# Patient Record
Sex: FEMALE | Race: WHITE
[De-identification: names, ages, dates, MRNs, and addresses within clinical notes are randomized per-mention and may not be internally consistent; named-entity substitution may affect disease eponyms.]

---

## 2020-08-26 ENCOUNTER — HOSPITAL ENCOUNTER (OUTPATIENT)
Dept: HOSPITAL 83 - ED | Age: 85
Setting detail: OBSERVATION
LOS: 2 days | Discharge: HOME | End: 2020-08-28
Attending: INTERNAL MEDICINE | Admitting: INTERNAL MEDICINE
Payer: MEDICARE

## 2020-08-26 VITALS — WEIGHT: 210.13 LBS | HEIGHT: 63.98 IN | BODY MASS INDEX: 35.87 KG/M2

## 2020-08-26 VITALS — DIASTOLIC BLOOD PRESSURE: 80 MMHG

## 2020-08-26 VITALS — SYSTOLIC BLOOD PRESSURE: 152 MMHG | DIASTOLIC BLOOD PRESSURE: 60 MMHG

## 2020-08-26 VITALS — DIASTOLIC BLOOD PRESSURE: 74 MMHG | SYSTOLIC BLOOD PRESSURE: 174 MMHG

## 2020-08-26 VITALS — SYSTOLIC BLOOD PRESSURE: 175 MMHG | DIASTOLIC BLOOD PRESSURE: 69 MMHG

## 2020-08-26 VITALS — DIASTOLIC BLOOD PRESSURE: 78 MMHG | SYSTOLIC BLOOD PRESSURE: 182 MMHG

## 2020-08-26 VITALS — SYSTOLIC BLOOD PRESSURE: 182 MMHG | DIASTOLIC BLOOD PRESSURE: 90 MMHG

## 2020-08-26 VITALS — DIASTOLIC BLOOD PRESSURE: 68 MMHG | SYSTOLIC BLOOD PRESSURE: 176 MMHG

## 2020-08-26 DIAGNOSIS — J01.10: ICD-10-CM

## 2020-08-26 DIAGNOSIS — R51: ICD-10-CM

## 2020-08-26 DIAGNOSIS — I16.1: Primary | ICD-10-CM

## 2020-08-26 DIAGNOSIS — E78.5: ICD-10-CM

## 2020-08-26 DIAGNOSIS — E55.9: ICD-10-CM

## 2020-08-26 DIAGNOSIS — E66.9: ICD-10-CM

## 2020-08-26 DIAGNOSIS — I10: ICD-10-CM

## 2020-08-26 DIAGNOSIS — R79.82: ICD-10-CM

## 2020-08-26 LAB
ALBUMIN SERPL-MCNC: 3.7 GM/DL (ref 3.1–4.5)
ALP SERPL-CCNC: 91 U/L (ref 45–117)
ALT SERPL W P-5'-P-CCNC: 19 U/L (ref 12–78)
APPEARANCE UR: CLEAR
APTT PPP: 29.9 SECONDS (ref 20–32.1)
AST SERPL-CCNC: 16 IU/L (ref 3–35)
BACTERIA #/AREA URNS HPF: (no result) /[HPF]
BASOPHILS # BLD AUTO: 0 10*3/UL (ref 0–0.1)
BASOPHILS NFR BLD AUTO: 0.5 % (ref 0–1)
BILIRUB UR QL STRIP: NEGATIVE
BUN SERPL-MCNC: 16 MG/DL (ref 7–24)
CHLORIDE SERPL-SCNC: 107 MMOL/L (ref 98–107)
COLOR UR: YELLOW
CREAT SERPL-MCNC: 0.79 MG/DL (ref 0.55–1.02)
EOSINOPHIL # BLD AUTO: 0 10*3/UL (ref 0–0.4)
EOSINOPHIL # BLD AUTO: 0.5 % (ref 1–4)
EPI CELLS #/AREA URNS HPF: (no result) /[HPF]
ERYTHROCYTE [DISTWIDTH] IN BLOOD BY AUTOMATED COUNT: 13.5 % (ref 0–14.5)
GLUCOSE UR QL: NEGATIVE
HCT VFR BLD AUTO: 44.3 % (ref 37–47)
HGB UR QL STRIP: NEGATIVE
INR BLD: 1 (ref 2–3.5)
KETONES UR QL STRIP: (no result)
LEUKOCYTE ESTERASE UR QL STRIP: (no result)
LIPASE SERPL-CCNC: 229 U/L (ref 73–393)
LYMPHOCYTES # BLD AUTO: 1.2 10*3/UL (ref 1.3–4.4)
LYMPHOCYTES NFR BLD AUTO: 13.9 % (ref 27–41)
MCH RBC QN AUTO: 30.7 PG (ref 27–31)
MCHC RBC AUTO-ENTMCNC: 32.7 G/DL (ref 33–37)
MCV RBC AUTO: 93.7 FL (ref 81–99)
MONOCYTES # BLD AUTO: 0.5 10*3/UL (ref 0.1–1)
MONOCYTES NFR BLD MANUAL: 5.9 % (ref 3–9)
NEUT #: 6.9 10*3/UL (ref 2.3–7.9)
NEUT %: 79 % (ref 47–73)
NITRITE UR QL STRIP: NEGATIVE
NRBC BLD QL AUTO: 0 % (ref 0–0)
PH UR STRIP: 7 [PH] (ref 4.5–8)
PLATELET # BLD AUTO: 253 10*3/UL (ref 130–400)
PMV BLD AUTO: 10.6 FL (ref 9.6–12.3)
POTASSIUM SERPL-SCNC: 4.3 MMOL/L (ref 3.5–5.1)
PROT SERPL-MCNC: 8.2 GM/DL (ref 6.4–8.2)
RBC # BLD AUTO: 4.73 10*6/UL (ref 4.1–5.1)
RBC #/AREA URNS HPF: (no result) RBC/HPF (ref 0–2)
SODIUM SERPL-SCNC: 139 MMOL/L (ref 136–145)
SP GR UR: 1.01 (ref 1–1.03)
TROPONIN I SERPL-MCNC: < 0.015 NG/ML (ref ?–0.04)
UROBILINOGEN UR STRIP-MCNC: 0.2 E.U./DL (ref 0–1)
WBC #/AREA URNS HPF: (no result) WBC/HPF (ref 0–5)
WBC NRBC COR # BLD AUTO: 8.7 10*3/UL (ref 4.8–10.8)

## 2020-08-26 NOTE — NUR
NOTIFIED DR. DE LEON PATIENTS MED REC IS UP TO DATE AND BP.
ALSO PATIENT REQUESTING A DOSE OF ZYRTEC NOW. NOTIFIFED

## 2020-08-26 NOTE — NUR
A 84, admitted to 5E, under the
services of DERRELL Harvey DO with a diagnosis of HYPERTENSIVE EMERGENCY.
Chief complaint is HIGH BP.
Patient arrived via stretcher from ER.
Monitor applied. Initial assessment completed.
Vital signs taken and recorded.
DERRELL HARVEY DO notified of admission to the unit.
Orders received.
See assessment for past medical history, medications
and allergies.
Patient and/or family oriented to unit. [facility] 5TH FLOOR
visitation policy reviewed.
Clothing/patient valuable form completed.
 
SRIDEVI MARTINEZ

## 2020-08-26 NOTE — NUR
PATIENT URINATED BUT LEFT SPECIMEN IN RESTROOM. SPECIMEN HAS BEEN PUT IN
BIOHAZARD DUE TO THIS AND ANOTHER SPECIMEN BEING IN RESTROOM.

## 2020-08-26 NOTE — NUR
PATIENT REPORT RECEIVED FROM SRIDEVI BARNES RN AT THIS TIME. ASSUMED CARE OF
PATIENT AT THIS TIME. PATIENT DENIES ANY PAIN OR DISCOMFORT AT THIS TIME. A&O
X3, CALL LIGHT WITHIN REACH. WILL CONTINUE TO MONITOR.

## 2020-08-27 VITALS — SYSTOLIC BLOOD PRESSURE: 127 MMHG | DIASTOLIC BLOOD PRESSURE: 50 MMHG

## 2020-08-27 VITALS — SYSTOLIC BLOOD PRESSURE: 170 MMHG | DIASTOLIC BLOOD PRESSURE: 82 MMHG

## 2020-08-27 VITALS — SYSTOLIC BLOOD PRESSURE: 144 MMHG | DIASTOLIC BLOOD PRESSURE: 67 MMHG

## 2020-08-27 VITALS — DIASTOLIC BLOOD PRESSURE: 58 MMHG

## 2020-08-27 VITALS — DIASTOLIC BLOOD PRESSURE: 62 MMHG

## 2020-08-27 LAB
25(OH)D3 SERPL-MCNC: 26.6 NG/ML (ref 30–100)
ALBUMIN SERPL-MCNC: 3.6 GM/DL (ref 3.1–4.5)
ALP SERPL-CCNC: 94 U/L (ref 45–117)
ALT SERPL W P-5'-P-CCNC: 19 U/L (ref 12–78)
AST SERPL-CCNC: 17 IU/L (ref 3–35)
BASOPHILS # BLD AUTO: 0 10*3/UL (ref 0–0.1)
BASOPHILS NFR BLD AUTO: 0.5 % (ref 0–1)
BUN SERPL-MCNC: 13 MG/DL (ref 7–24)
CHLORIDE SERPL-SCNC: 106 MMOL/L (ref 98–107)
CHOLEST SERPL-MCNC: 207 MG/DL (ref ?–200)
CREAT SERPL-MCNC: 0.7 MG/DL (ref 0.55–1.02)
EOSINOPHIL # BLD AUTO: 0.1 10*3/UL (ref 0–0.4)
EOSINOPHIL # BLD AUTO: 1.8 % (ref 1–4)
ERYTHROCYTE [DISTWIDTH] IN BLOOD BY AUTOMATED COUNT: 13.4 % (ref 0–14.5)
HCT VFR BLD AUTO: 46.5 % (ref 37–47)
HDLC SERPL-MCNC: 44 MG/DL (ref 40–60)
LDLC SERPL DIRECT ASSAY-MCNC: 145 MG/DL (ref 9–159)
LYMPHOCYTES # BLD AUTO: 1.5 10*3/UL (ref 1.3–4.4)
LYMPHOCYTES NFR BLD AUTO: 21 % (ref 27–41)
MCH RBC QN AUTO: 30.2 PG (ref 27–31)
MCHC RBC AUTO-ENTMCNC: 32.5 G/DL (ref 33–37)
MCV RBC AUTO: 93 FL (ref 81–99)
MONOCYTES # BLD AUTO: 0.6 10*3/UL (ref 0.1–1)
MONOCYTES NFR BLD MANUAL: 8.8 % (ref 3–9)
NEUT #: 4.9 10*3/UL (ref 2.3–7.9)
NEUT %: 67.8 % (ref 47–73)
NRBC BLD QL AUTO: 0 10*3/UL (ref 0–0)
PLATELET # BLD AUTO: 257 10*3/UL (ref 130–400)
PMV BLD AUTO: 10.4 FL (ref 9.6–12.3)
POTASSIUM SERPL-SCNC: 4 MMOL/L (ref 3.5–5.1)
PROT SERPL-MCNC: 8 GM/DL (ref 6.4–8.2)
RBC # BLD AUTO: 5 10*6/UL (ref 4.1–5.1)
SODIUM SERPL-SCNC: 138 MMOL/L (ref 136–145)
TRIGL SERPL-MCNC: 91 MG/DL (ref ?–150)
TROPONIN I: < 0.015 NG/ML
TSH SERPL DL<=0.005 MIU/L-ACNC: 1.84 UIU/ML (ref 0.36–4.75)
VITAMIN B12: 535 PG/ML (ref 247–911)
VLDLC SERPL CALC-MCNC: 18 MG/DL (ref 6–40)
WBC NRBC COR # BLD AUTO: 7.3 10*3/UL (ref 4.8–10.8)

## 2020-08-27 NOTE — NUR
PATIENT RESTING IN BED IN A POSITION OF COMFORT AT THIS TIME, NO SIGNS OR
SYMPTOMS OF PAIN OR DISTRESS NOTED AT THIS TIME. RESPIRATIONS EASY AND
NON-LABORED. CALL LIGHT WITHIN REACH, WILL CONTINUE TO MONITOR.

## 2020-08-27 NOTE — NUR
in to talk to patient.
Patient states lives at home with alone.
There are 27  steps in the home.
Physician: eliu suazo in [place]
Pharmacy: [facility]
Home health services: none
Patient's level of ADLs: INDEPENDENT
Patient has working utilities: all working
DME: cane
Follow-up physician's appointment after d/c: [facility] upon [facility]
Does patient want to access PORTAL?: no
Discharge plan discussed with patient, she states she lives at home alone, is
independent in adls and ambulation, drives, has a cane to use if needed.
discussed with her a discharge plan and she stated she would be returning home
and denies any home needs, discussed [facility] and educated her on the services they
offer, she declined any home needs or home health at this time, case management
will follow.
 
KARYNA GARCIA

## 2020-08-27 NOTE — NUR
PT SITTING UP IN BED, EATING DINNER. NO S/S OF DISTRESS NOTED. RESPIRATIONS
UNLABORED ON ROOM AIR. ALL NEEDS ARE CURRENTLY MET. PT GIVEN PRUNE JUICE FOR
HER REQUEST TO ASSIST IN HAVING A BOWEL MOVEMENT, AS SHE USUALLY HAS A BOWEL
MOVEMENT DAILY. WILL MONITOR. CALL LIGHT IN REACH.

## 2020-08-27 NOTE — NUR
DR GALLEGO NOTIFIED THAT PT IS REQUESTING SOMETHING TOPICAL FOR LEFT SHOULDER
PAIN.  NEW ORDERS RECEIVED. PT NOTIFIED AND IS CURRENTLY HAVING ECHO COMPLETED
AT BEDSIDE. WILL MEIDCATE WITH TYLENOL AFTER ECHO IS COMPLETED.

## 2020-08-27 NOTE — NUR
Neurological:      awake,alert,oriented
 
Respiratory:       easy, regular,no distress
 
Breath sounds:     clear
 
Cough:             none
 
Cardiovascular:    hypertensive
 
Gastrointestinal:  soft
 
Genito/Urinary:    no problem
 
Musculoskeketal:   AMBULATORY
PUPILS ARE DILATED BUT REACTIVE
 
 
SILVERIO PADILLA

## 2020-08-27 NOTE — NUR
PT GIVEN TYLENOL 650 MG PO AT THIS TIME FOR C/O LEFT SHOULDER PAIN. PHYSICIANS
AWARE OF SHOULDER PAIN. WILL MONITOR FOR EFFECTIVENESS. CALL LIGHT IN REACH.

## 2020-08-28 VITALS — DIASTOLIC BLOOD PRESSURE: 61 MMHG

## 2020-08-28 VITALS — DIASTOLIC BLOOD PRESSURE: 75 MMHG | SYSTOLIC BLOOD PRESSURE: 159 MMHG

## 2020-08-28 VITALS — DIASTOLIC BLOOD PRESSURE: 45 MMHG

## 2020-08-28 LAB
BUN SERPL-MCNC: 27 MG/DL (ref 7–24)
CHLORIDE SERPL-SCNC: 103 MMOL/L (ref 98–107)
CREAT SERPL-MCNC: 0.9 MG/DL (ref 0.55–1.02)
POTASSIUM SERPL-SCNC: 4.6 MMOL/L (ref 3.5–5.1)
SODIUM SERPL-SCNC: 135 MMOL/L (ref 136–145)

## 2020-08-28 NOTE — NUR
TOOK OVER CARE OF PT AT THIS TIME. PT RESTING IN BED. RESPIRATIONS UNLABORED
ON ROOM AIR. NO S/S OF DISTRESS NOTED. CALL LIGHT IN REACH.

## 2020-08-28 NOTE — NUR
Discharge instructions reviewed with patient/family. Patient receptive and
verbalizes understanding. Follow-up care arranged. Written instructions given
to patient/family.
RICH NANCE

## 2020-08-28 NOTE — NUR
NOTIFIED DR YANES THAT PT WOULD LIKE TO SHOWER. ORDERS RECEIVED TO D/C HEART
MONTIOR AND PHYSICIAN STATES THAT IT IS OKAY FOR PT TO SHOWER.

## 2021-05-12 ENCOUNTER — HOSPITAL ENCOUNTER (OUTPATIENT)
Dept: HOSPITAL 83 - LAB | Age: 86
Discharge: HOME | End: 2021-05-12
Attending: FAMILY MEDICINE
Payer: MEDICARE

## 2021-05-12 DIAGNOSIS — E78.00: ICD-10-CM

## 2021-05-12 DIAGNOSIS — R53.83: ICD-10-CM

## 2021-05-12 DIAGNOSIS — E55.9: ICD-10-CM

## 2021-05-12 DIAGNOSIS — I10: Primary | ICD-10-CM

## 2021-05-12 DIAGNOSIS — J32.9: ICD-10-CM

## 2021-05-12 LAB
25(OH)D3 SERPL-MCNC: 49.3 NG/ML (ref 30–100)
ALBUMIN SERPL-MCNC: 3.6 GM/DL (ref 3.1–4.5)
ALP SERPL-CCNC: 93 U/L (ref 45–117)
ALT SERPL W P-5'-P-CCNC: 24 U/L (ref 12–78)
AST SERPL-CCNC: 20 IU/L (ref 3–35)
BUN SERPL-MCNC: 18 MG/DL (ref 7–24)
CHLORIDE SERPL-SCNC: 107 MMOL/L (ref 98–107)
CHOLEST SERPL-MCNC: 200 MG/DL (ref ?–200)
CREAT SERPL-MCNC: 1 MG/DL (ref 0.55–1.02)
ERYTHROCYTE [DISTWIDTH] IN BLOOD BY AUTOMATED COUNT: 14 % (ref 0–14.5)
HCT VFR BLD AUTO: 46.7 % (ref 37–47)
LDLC SERPL DIRECT ASSAY-MCNC: 133 MG/DL (ref 9–159)
MCH RBC QN AUTO: 30.7 PG (ref 27–31)
MCHC RBC AUTO-ENTMCNC: 32.5 G/DL (ref 33–37)
MCV RBC AUTO: 94.3 FL (ref 81–99)
NRBC BLD QL AUTO: 0 10*3/UL (ref 0–0)
PLATELET # BLD AUTO: 270 10*3/UL (ref 130–400)
PMV BLD AUTO: 10.8 FL (ref 9.6–12.3)
POTASSIUM SERPL-SCNC: 4.4 MMOL/L (ref 3.5–5.1)
PROT SERPL-MCNC: 8.2 GM/DL (ref 6.4–8.2)
RBC # BLD AUTO: 4.95 10*6/UL (ref 4.1–5.1)
SODIUM SERPL-SCNC: 139 MMOL/L (ref 136–145)
T4 FREE SERPL-MCNC: 1.22 NG/DL (ref 0.76–1.46)
TRIGL SERPL-MCNC: 116 MG/DL (ref ?–150)
TSH SERPL DL<=0.005 MIU/L-ACNC: 1.73 UIU/ML (ref 0.36–4.75)
VITAMIN B12: 497 PG/ML (ref 247–911)
WBC NRBC COR # BLD AUTO: 7.9 10*3/UL (ref 4.8–10.8)

## 2021-07-14 ENCOUNTER — HOSPITAL ENCOUNTER (EMERGENCY)
Dept: HOSPITAL 83 - ED | Age: 86
Discharge: HOME | End: 2021-07-14
Payer: MEDICARE

## 2021-07-14 VITALS — WEIGHT: 200 LBS | HEIGHT: 58.98 IN | BODY MASS INDEX: 40.32 KG/M2

## 2021-07-14 DIAGNOSIS — Z90.711: ICD-10-CM

## 2021-07-14 DIAGNOSIS — R20.2: Primary | ICD-10-CM

## 2021-07-14 DIAGNOSIS — Z79.899: ICD-10-CM

## 2021-07-14 DIAGNOSIS — E78.5: ICD-10-CM

## 2021-07-14 DIAGNOSIS — Z96.659: ICD-10-CM

## 2021-07-14 DIAGNOSIS — E66.9: ICD-10-CM

## 2021-07-14 DIAGNOSIS — R20.0: ICD-10-CM

## 2021-07-14 DIAGNOSIS — Z90.49: ICD-10-CM

## 2021-07-14 DIAGNOSIS — I10: ICD-10-CM

## 2021-07-14 LAB
BASOPHILS # BLD AUTO: 0 10*3/UL (ref 0–0.1)
BASOPHILS NFR BLD AUTO: 0.3 % (ref 0–1)
BUN SERPL-MCNC: 17 MG/DL (ref 7–24)
CHLORIDE SERPL-SCNC: 105 MMOL/L (ref 98–107)
CREAT SERPL-MCNC: 0.89 MG/DL (ref 0.55–1.02)
EOSINOPHIL # BLD AUTO: 0 10*3/UL (ref 0–0.4)
EOSINOPHIL # BLD AUTO: 0.2 % (ref 1–4)
ERYTHROCYTE [DISTWIDTH] IN BLOOD BY AUTOMATED COUNT: 13.3 % (ref 0–14.5)
HCT VFR BLD AUTO: 43.8 % (ref 37–47)
LYMPHOCYTES # BLD AUTO: 1.3 10*3/UL (ref 1.3–4.4)
LYMPHOCYTES NFR BLD AUTO: 15.1 % (ref 27–41)
MCH RBC QN AUTO: 30.4 PG (ref 27–31)
MCHC RBC AUTO-ENTMCNC: 32.6 G/DL (ref 33–37)
MCV RBC AUTO: 93 FL (ref 81–99)
MONOCYTES # BLD AUTO: 0.5 10*3/UL (ref 0.1–1)
MONOCYTES NFR BLD MANUAL: 6.1 % (ref 3–9)
NEUT #: 6.8 10*3/UL (ref 2.3–7.9)
NEUT %: 78 % (ref 47–73)
NRBC BLD QL AUTO: 0 10*3/UL (ref 0–0)
PLATELET # BLD AUTO: 265 10*3/UL (ref 130–400)
PMV BLD AUTO: 10.6 FL (ref 9.6–12.3)
POTASSIUM SERPL-SCNC: 3.8 MMOL/L (ref 3.5–5.1)
RBC # BLD AUTO: 4.71 10*6/UL (ref 4.1–5.1)
SODIUM SERPL-SCNC: 131 MMOL/L (ref 136–145)
TROPONIN I SERPL-MCNC: < 0.015 NG/ML (ref ?–0.04)
WBC NRBC COR # BLD AUTO: 8.7 10*3/UL (ref 4.8–10.8)